# Patient Record
Sex: MALE | Race: WHITE
[De-identification: names, ages, dates, MRNs, and addresses within clinical notes are randomized per-mention and may not be internally consistent; named-entity substitution may affect disease eponyms.]

---

## 2020-06-06 ENCOUNTER — TRANSCRIPTION ENCOUNTER (OUTPATIENT)
Age: 20
End: 2020-06-06

## 2020-06-16 ENCOUNTER — TRANSCRIPTION ENCOUNTER (OUTPATIENT)
Age: 20
End: 2020-06-16

## 2020-07-14 ENCOUNTER — EMERGENCY (EMERGENCY)
Facility: HOSPITAL | Age: 20
LOS: 0 days | Discharge: HOME | End: 2020-07-14
Payer: COMMERCIAL

## 2020-07-14 VITALS
HEART RATE: 84 BPM | SYSTOLIC BLOOD PRESSURE: 127 MMHG | RESPIRATION RATE: 18 BRPM | DIASTOLIC BLOOD PRESSURE: 49 MMHG | TEMPERATURE: 99 F | OXYGEN SATURATION: 97 %

## 2020-07-14 DIAGNOSIS — Z91.030 BEE ALLERGY STATUS: ICD-10-CM

## 2020-07-14 DIAGNOSIS — Z20.828 CONTACT WITH AND (SUSPECTED) EXPOSURE TO OTHER VIRAL COMMUNICABLE DISEASES: ICD-10-CM

## 2020-07-14 PROCEDURE — 99283 EMERGENCY DEPT VISIT LOW MDM: CPT | Mod: 25

## 2020-07-14 PROCEDURE — U0003: CPT

## 2020-07-14 PROCEDURE — 99283 EMERGENCY DEPT VISIT LOW MDM: CPT

## 2020-07-14 NOTE — ED ADULT TRIAGE NOTE - CHIEF COMPLAINT QUOTE
Patient presents for COVID-19 testing.  Patient provides verbal consent to receive results via text or email.

## 2020-07-14 NOTE — ED ADULT NURSE NOTE - CHIEF COMPLAINT QUOTE
Patient presents for COVID-19 testing.  Patient provides verbal consent to receive results via text or email.
Private Vehicle

## 2020-07-14 NOTE — ED ADULT NURSE NOTE - OBJECTIVE STATEMENT
Patient evaluated, treated, and discharged by PA. Refer to PA note for assessment. kl Discharge instructions given verbally and understood by patient. Self-quarantine and COVID-19 information provided to patient. Unable to sign secondary to COVID-19 PUI.

## 2020-07-14 NOTE — ED STATDOCS - NSFOLLOWUPINSTRUCTIONS_ED_ALL_ED_FT
How to get your Coronavirus (COVID-19) Testing Results:   Please be advised that you were tested for the coronavirus (COVID-19) in the Emergency Department at [facility].  You are to maintain self-quarantine procedures for 14 days until instructed otherwise by one of our healthcare agents. Please note that the test may take up to 2-4 days to result.  If you do not hear from us within 72 hours and you'd like to check on your results, you can call on of our coronavirus specialists at [address] [place] [address] (available 24/7).  Please DO NOT call the site where you received the test to obtain your results.

## 2020-07-14 NOTE — ED STATDOCS - PATIENT PORTAL LINK FT
You can access the FollowMyHealth Patient Portal offered by [facility] by registering at the following website: [email or web address]. By joining [employer]’s FollowMyHealth portal, you will also be able to view your health information using other applications (apps) compatible with our system.

## 2020-07-15 LAB — SARS-COV-2 RNA SPEC QL NAA+PROBE: SIGNIFICANT CHANGE UP

## 2021-07-12 ENCOUNTER — EMERGENCY (EMERGENCY)
Facility: HOSPITAL | Age: 21
LOS: 0 days | Discharge: HOME | End: 2021-07-12
Attending: EMERGENCY MEDICINE
Payer: COMMERCIAL

## 2021-07-12 VITALS
TEMPERATURE: 98 F | HEIGHT: 72 IN | SYSTOLIC BLOOD PRESSURE: 128 MMHG | HEART RATE: 81 BPM | DIASTOLIC BLOOD PRESSURE: 83 MMHG | WEIGHT: 169.98 LBS | RESPIRATION RATE: 14 BRPM | OXYGEN SATURATION: 100 %
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
## 2021-07-12 DIAGNOSIS — M25.571 PAIN IN RIGHT ANKLE AND JOINTS OF RIGHT FOOT: ICD-10-CM
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
## 2021-07-12 DIAGNOSIS — S93.401A SPRAIN OF UNSPECIFIED LIGAMENT OF RIGHT ANKLE, INITIAL ENCOUNTER: ICD-10-CM
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
## 2021-07-12 DIAGNOSIS — Z91.030 BEE ALLERGY STATUS: ICD-10-CM

## 2021-07-12 PROCEDURE — 99283 EMERGENCY DEPT VISIT LOW MDM: CPT

## 2021-07-12 PROCEDURE — 99283 EMERGENCY DEPT VISIT LOW MDM: CPT | Mod: 25

## 2021-07-12 PROCEDURE — 73610 X-RAY EXAM OF ANKLE: CPT | Mod: RT

## 2021-07-12 PROCEDURE — 73610 X-RAY EXAM OF ANKLE: CPT | Mod: 26,RT

## 2021-07-12 NOTE — ED PROVIDER NOTE - CLINICAL SUMMARY MEDICAL DECISION MAKING FREE TEXT BOX
Pt with sprain vs fracture.  No ligamentous laxity on exam.  XR performed without any e/o fracture.  Placed in aircast and pt to f/u ortho in the next week for repeat imaging.  D/c home with strict return precautions and prompt outpatient f/u.

## 2021-07-12 NOTE — ED PROVIDER NOTE - OBJECTIVE STATEMENT
21 y M no sig pmh presenting with R ankle pain and  swelling after inverting while coming  down with rebound playing basketball tonight.  Able  to bear weight, but with some pain.  No other injuries.

## 2021-07-12 NOTE — ED ADULT NURSE REASSESSMENT NOTE - NS ED NURSE REASSESS COMMENT FT1
Pt cleared for dc per MD Mancia. Pt given ankle splint and crutches. Instructions reviewd and signed with pt. VSS. No acute distress noted at this time.

## 2021-07-12 NOTE — ED PROVIDER NOTE - NS ED ROS FT
Constitutional: nad, well appearing  HEENT:  no nasal congestion, eye drainage or ear pain.    CVS:  no cp  Resp:  No sob, no cough  GI:  no abdominal pain, no nausea or vomiting  :  no dysuria  MSK: +R ankle pain  Skin: no rash  Neuro: no change in mental status or level of consciousness  Heme/lymph: no bleeding

## 2021-07-12 NOTE — ED PROVIDER NOTE - PATIENT PORTAL LINK FT
You can access the FollowMyHealth Patient Portal offered by [facility] by registering at the following website: [email or web address]. By joining [employer]’s FollowMyHealth portal, you will also be able to view your health information using other applications (apps) compatible with our system.

## 2021-07-12 NOTE — ED PROVIDER NOTE - PHYSICAL EXAMINATION
Constitutional: NAD, well appearing  HEENT: no rhinorrhea  CVS:  wwp  Resp:  no resp distress  GI: soft, ntnd  MSK:  mild swelling to right lateral  mall with mild ttp, no ttp to base of 5th metatarsal.  DP pulse 2+.  NVI to distal RLE.    Neuro: NVI to distal RLE  Skin: no rash  psych: clear thought content  Heme/lymph:  No LAD

## 2023-03-21 NOTE — ED ADULT TRIAGE NOTE - CCCP TRG CHIEF CMPLNT
TRISTEN RADHA  77y  Male      Patient is a 77y old  Male who presents with a chief complaint of mechanical Fall/ Hip Fx (20 Mar 2023 18:31)      INTERVAL HPI/OVERNIGHT EVENTS: no acute events overnight. pain well tolerated. no nursing concerns.       REVIEW OF SYSTEMS:  CONSTITUTIONAL: No fever, weight loss, or fatigue  RESPIRATORY: No cough, wheezing, chills or hemoptysis; No shortness of breath  CARDIOVASCULAR: No chest pain, palpitations, dizziness, or leg swelling  GASTROINTESTINAL: No abdominal or epigastric pain. No nausea, vomiting, or hematemesis; No diarrhea or constipation. No melena or hematochezia.  GENITOURINARY: No dysuria, frequency, hematuria, or incontinence  NEUROLOGICAL: No headaches, memory loss, loss of strength, numbness, or tremors  SKIN: No itching, burning, rashes, or lesions   MUSCULOSKELETAL: No joint pain or swelling; No muscle, back, or extremity pain  PSYCHIATRIC: No depression, anxiety, mood swings, or difficulty sleeping  All other review of systems negative    T(C): 36.7 (03-21-23 @ 05:29), Max: 37.6 (03-20-23 @ 12:43)  HR: 91 (03-21-23 @ 05:29) (91 - 108)  BP: 112/64 (03-21-23 @ 05:29) (109/73 - 127/81)  RR: 18 (03-21-23 @ 05:29) (17 - 18)  SpO2: 95% (03-20-23 @ 15:12) (95% - 97%)  Wt(kg): --Vital Signs Last 24 Hrs  T(C): 36.7 (21 Mar 2023 05:29), Max: 37.6 (20 Mar 2023 12:43)  T(F): 98 (21 Mar 2023 05:29), Max: 99.6 (20 Mar 2023 12:43)  HR: 91 (21 Mar 2023 05:29) (91 - 108)  BP: 112/64 (21 Mar 2023 05:29) (109/73 - 127/81)  BP(mean): --  RR: 18 (21 Mar 2023 05:29) (17 - 18)  SpO2: 95% (20 Mar 2023 15:12) (95% - 97%)    Parameters below as of 20 Mar 2023 15:12  Patient On (Oxygen Delivery Method): room air          03-20-23 @ 07:01  -  03-21-23 @ 07:00  --------------------------------------------------------  IN: 0 mL / OUT: 150 mL / NET: -150 mL        PHYSICAL EXAM:  GENERAL: elderly M, NAD, non toxic appearing  PSYCH: no agitation, baseline mentation  NERVOUS SYSTEM:  Alert & Oriented X3   PULMONARY: symmetrical chest rise, no accessory muscle use  CARDIOVASCULAR: non tachycardic  GI: Soft, Nontender, Nondistended; Bowel sounds present  EXTREMITIES: No clubbing, cyanosis, or edema  SKIN: No rashes or lesions    Consultant(s) Notes Reviewed:  [x ] YES  [ ] NO    Discussed with Consultants/Other Providers [ x] YES     LABS                          12.2   11.39 )-----------( 205      ( 20 Mar 2023 07:23 )             35.4     03-20    139  |  104  |  23<H>  ----------------------------<  145<H>  4.7   |  19  |  1.2    Ca    9.6      20 Mar 2023 07:23    TPro  6.5  /  Alb  4.1  /  TBili  1.5<H>  /  DBili  x   /  AST  20  /  ALT  11  /  AlkPhos  137<H>  03-20  PT/INR - ( 20 Mar 2023 07:23 )   PT: 12.00 sec;   INR: 1.05 ratio    PTT - ( 20 Mar 2023 07:23 )  PTT:27.2 sec    CARDIAC MARKERS ( 20 Mar 2023 07:23 )  x     / <0.01 ng/mL / 455 U/L / x     / x        RADIOLOGY & ADDITIONAL TESTS:  CT Abdomen and Pelvis w/ IV Cont (03.20.23 @ 08:43) >  IMPRESSION:  1. Acute, comminuted right femoral intertrochanteric fracture.  2. No evidence of traumatic intrathoracic pathology or solid abdominal   organ injury.    CT Cervical Spine No Cont (03.20.23 @ 08:36) >  IMPRESSION:  CT HEAD:  No acute intracranial pathology or hemorrhage. No acute calvarial   fracture.  Mild chronic microvascular type changes.  CT CERVICAL SPINE:  No acute fracture or subluxation.  Multilevel degenerative changes as detailed above.    Xray Pelvis AP only (03.20.23 @ 07:56) >  impression:  Displaced, comminuted right femoral intertrochanteric fracture.   Degenerative changes of the hips and spine. Vascular calcifications.    Imaging Personally Reviewed:  x[ ] YES  [ ] NO    HEALTH ISSUES - PROBLEM Dx:    MEDICATIONS  (STANDING):  aspirin  chewable 81 milliGRAM(s) Oral daily  atorvastatin 80 milliGRAM(s) Oral at bedtime  atropine 1% Solution 1 Drop(s) Left EYE two times a day  erythromycin   Ointment 1 Application(s) Left EYE two times a day  morphine  - Injectable 4 milliGRAM(s) IV Push once  prednisoLONE acetate 1% Suspension 1 Drop(s) Left EYE every 6 hours  tamsulosin 0.4 milliGRAM(s) Oral every 12 hours    MEDICATIONS  (PRN):  acetaminophen     Tablet .. 650 milliGRAM(s) Oral every 6 hours PRN Temp greater or equal to 38C (100.4F), Mild Pain (1 - 3)  aluminum hydroxide/magnesium hydroxide/simethicone Suspension 30 milliLiter(s) Oral every 4 hours PRN Dyspepsia  melatonin 3 milliGRAM(s) Oral at bedtime PRN Insomnia  morphine  - Injectable 4 milliGRAM(s) IV Push every 6 hours PRN Moderate Pain (4 - 6)  ondansetron Injectable 4 milliGRAM(s) IV Push every 8 hours PRN Nausea and/or Vomiting     medical evaluation

## 2024-07-31 NOTE — ED STATDOCS - PRO INTERPRETER NEED 2
Group Therapy Note    Date: 7/31/2024    Group Start Time: 1015  Group End Time: 1100  Group Topic: Psychoeducation    [facility] ACUTE [facility]    Sarah Talavera, GONZALO        Group Therapy Note    Module Name:  Journaling as therapy     Patient's Goal:  pt will be able to id how and what they can use it for. Pts participated in decorating their journals.     Notes:  Pt pleasant and engaged in group discussion and activity.     Status After Intervention:  Improved    Participation Level: Active Listener and Interactive    Participation Quality: Appropriate, Attentive, and Sharing      Speech:  normal      Thought Process/Content: Logical      Affective Functioning: Congruent      Mood: euthymic      Level of consciousness:  Alert, Oriented x4, and Attentive      Response to Learning: Able to verbalize/acknowledge new learning, Able to retain information, and Progressing to goal      Endings: None Reported    Modes of Intervention: Education, Support, Socialization, and Clarifying      Discipline Responsible: Psychoeducational Specialist      Signature:  GONZALO Romero          English